# Patient Record
(demographics unavailable — no encounter records)

---

## 2025-02-06 NOTE — PROCEDURE
[FreeTextEntry1] : left great saphenous vein radiofrequency ablation [FreeTextEntry2] : venous insufficiency [FreeTextEntry3] : Indication: left lower extremity varicose veins with ulcer, inflammation, leg pain and leg swelling.  Venous insufficiency/ reflux.  Assistant: TONY aTbor  Procedure: radiofrequency ablation of the left great saphenous vein.   Mr. VENESSA GALLAGHER is a 74 year old M with a history of left lower extremity varicose veins previously seen in the office.  Ultrasound examination demonstrated venous insufficiency. A trial of compression stockings, exercise, elevation, and pain medication was attempted without relief and definitive treatment with radiofrequency ablation was offered.   The patient has come for radiofrequency ablation treatment of the left great saphenous vein.  I have discussed the risks of the procedure at length with the patient. The risks discussed were inclusive of but not limited to infection, irritation at the site of infiltration of local anesthesia, possible numbness lower extremity and rare risk of deep venous thrombosis and pulmonary emboli. The patient agrees to proceed with the procedure.   The patient was escorted into the procedure room and a time out called.  The entire limb was prepped and draped in sterile fashion. The RF fiber was placed on the sterile field and connected by a sterile cable. Actuation, temperature, and impedance testing were performed to ensure that all components were connected and operating properly. The patient was placed on the procedure table and local anesthesia was instilled in the skin overlying the access site. Under ultrasound guidance, the vein was punctured with a micro puncture needle, using the anterior wall technique. A guide wire was now introduced through the needle, and the needle was then exchanged over the guide wire for a 7F sheath. The guide wire was removed, and the RF probe was then placed into the left great saphenous vein through the sheath and position confirmed using ultrasound guidance. After the RF probe position was verified by ultrasound, tumescent anesthesia consisting of normal saline, 1% lidocaine with 8.4% sodium bicarbonate was infiltrated, under ultrasound guidance, precisely into the perivenous compartment along the entire length of the vein until a halo of fluid was noted around the vein. After RF probe position was again confirmed with ultrasound imaging, RF energy was applied. The probe was gradually and carefully withdrawn at a rate of 6.5cm/20seconds.   9 cycles of RF performed using the 7 cm probe Total treatment time was 3:00 seconds. The total volume injected was 350 cc Treatment length was 40 cm and The probe is >3.5 cm from the SFJ.   Estimated Blood Loss: minimal   Repeat ultrasound of the treated vein was performed confirming successful treatment. The catheter and sheath were withdrawn, and hemostasis established with direct pressure. After assuring hemostasis, a sterile 4x4 was placed on the access site and an ACE compression wrap was applied. Patient tolerated procedure well. Patient was given post-procedure instructions and follow up appointment was scheduled.

## 2025-02-06 NOTE — REASON FOR VISIT
[Procedure: _________] : a [unfilled] procedure visit [FreeTextEntry1] : left great saphenous vein radiofrequency ablation

## 2025-02-06 NOTE — ASSESSMENT
[TextEntry] : Mr. VENESSA GALLAGHER is a 74 year old here for left great saphenous vein radiofrequency ablation

## 2025-02-27 NOTE — ASSESSMENT
[TextEntry] : Mr. VENESSA GALLAGHER is a 74 year old here for left small saphenous vein radiofrequency ablation

## 2025-02-27 NOTE — PROCEDURE
[FreeTextEntry1] : left small saphenous vein radiofrequency ablation [FreeTextEntry2] : venous stasis ulcer [FreeTextEntry3] : Indication: left lower extremity varicose veins with ulcer, inflammation, leg pain and leg swelling.  Venous insufficiency/ reflux.   Procedure: radiofrequency ablation of the left small saphenous vein.  Assistant: Annita Martinez PA-C  Mr. VENESSA GALLAGHER is a 74 year old M with a history of left lower extremity varicose veins previously seen in the office.  Ultrasound examination demonstrated venous insufficiency. A trial of compression stockings, exercise, elevation, and pain medication was attempted without relief and definitive treatment with radiofrequency ablation was offered.   The patient has come for radiofrequency ablation treatment of the left small saphenous vein.  I have discussed the risks of the procedure at length with the patient. The risks discussed were inclusive of but not limited to infection, irritation at the site of infiltration of local anesthesia, possible numbness lower extremity and rare risk of deep venous thrombosis and pulmonary emboli. The patient agrees to proceed with the procedure.   The patient was escorted into the procedure room and a time out called.  The entire limb was prepped and draped in sterile fashion. The RF fiber was placed on the sterile field and connected by a sterile cable. Actuation, temperature, and impedance testing were performed to ensure that all components were connected and operating properly. The patient was placed on the procedure table and local anesthesia was instilled in the skin overlying the access site. Under ultrasound guidance, the vein was punctured with a micro puncture needle, using the anterior wall technique. A guide wire was now introduced through the needle, and the needle was then exchanged over the guide wire for a 7F sheath. The guide wire was removed, and the RF probe was then placed into the left small saphenous vein through the sheath and position confirmed using ultrasound guidance. After the RF probe position was verified by ultrasound, tumescent anesthesia consisting of normal saline, 1% lidocaine with 8.4% sodium bicarbonate was infiltrated, under ultrasound guidance, precisely into the perivenous compartment along the entire length of the vein until a halo of fluid was noted around the vein. After RF probe position was again confirmed with ultrasound imaging, RF energy was applied. The probe was gradually and carefully withdrawn at a rate of 2.5cm/20seconds.   9 cycles of RF performed using the 3 cm probe Total treatment time was 3:00 seconds. The total volume injected was 250 cc Treatment length was 20 cm and The probe is > 3cm from the SPJ.   Estimated Blood Loss: minimal   Repeat ultrasound of the treated vein was performed confirming successful treatment. The catheter and sheath were withdrawn, and hemostasis established with direct pressure. After assuring hemostasis, a sterile 4x4 was placed on the access site and an ACE compression wrap was applied. Patient tolerated procedure well. Patient was given post-procedure instructions and follow up appointment was scheduled.

## 2025-02-27 NOTE — REASON FOR VISIT
[Procedure: _________] : a [unfilled] procedure visit [FreeTextEntry1] : left small saphenous vein radiofrequency ablation